# Patient Record
Sex: FEMALE | Race: WHITE
[De-identification: names, ages, dates, MRNs, and addresses within clinical notes are randomized per-mention and may not be internally consistent; named-entity substitution may affect disease eponyms.]

---

## 2018-08-22 ENCOUNTER — HOSPITAL ENCOUNTER (EMERGENCY)
Dept: HOSPITAL 41 - JD.ED | Age: 48
Discharge: HOME | End: 2018-08-22
Payer: SELF-PAY

## 2018-08-22 DIAGNOSIS — Z79.899: ICD-10-CM

## 2018-08-22 DIAGNOSIS — Z91.09: ICD-10-CM

## 2018-08-22 DIAGNOSIS — S20.211A: Primary | ICD-10-CM

## 2018-08-22 DIAGNOSIS — I10: ICD-10-CM

## 2018-08-22 DIAGNOSIS — Z91.030: ICD-10-CM

## 2018-08-22 DIAGNOSIS — I25.2: ICD-10-CM
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
## 2018-08-22 DIAGNOSIS — F17.210: ICD-10-CM

## 2018-08-22 PROCEDURE — 99283 EMERGENCY DEPT VISIT LOW MDM: CPT

## 2018-08-22 PROCEDURE — 71045 X-RAY EXAM CHEST 1 VIEW: CPT

## 2018-08-22 NOTE — EDM.PDOC
ED HPI GENERAL MEDICAL PROBLEM





- General


Chief Complaint: General


Stated Complaint: FALL/RIB PAIN


Time Seen by Provider: 08/22/18 14:51


Source of Information: Reports: Patient


History Limitations: Reports: No Limitations





- History of Present Illness


INITIAL COMMENTS - FREE TEXT/NARRATIVE: 


Patient is a 48-year-old female with a history of right rib fractures 4 weeks 

ago who fell last night when her sister's dog chain wrapped around her leg 

causing her to fall off the deck approximately 3 feet. Patient landed on her 

right ribs. Since then has developed a small lump with increasing pain along 

where the previous fracture was noted. She has not taken anything for the pain. 

Pain is worse with palpation and also taking a deep breath. There is no 

hemoptysis. Denies any neck or back pain. No head trauma or loss of conscious. 

No numbness/tingling to extremities. No other complaints.





Treatments PTA: Reports: Other (see below)


Other Treatments PTA: none


  ** Right


Pain Score (Numeric/FACES): 10





- Related Data


 Allergies











Allergy/AdvReac Type Severity Reaction Status Date / Time


 


bee venom protein (honey bee) Allergy  Anaphylactic Verified 08/22/18 16:24





   Shock  


 


insect venom Allergy  Anaphylactic Verified 08/22/18 16:24





   Shock  


 


suture Allergy  Hives Verified 08/22/18 16:24











Home Meds: 


 Home Meds





Acetaminophen/oxyCODONE [Percocet 325-5 MG] 1 each PO Q6H PRN #12 tab 08/22/18 [

Rx]


Lisinopril 10 mg PO DAILY 08/22/18 [History]











Past Medical History


Cardiovascular History: Reports: Hypertension, MI


Other Cardiovascular History: 2017 MI


Musculoskeletal History: Reports: Other (See Below)


Other Musculoskeletal History: rib fx


Neurological History: Reports: Brain Injury, Head Trauma





- Past Surgical History


HEENT Surgical History: Reports: Tonsillectomy


GI Surgical History: Reports: Appendectomy


Female  Surgical History: Reports: Hysterectomy, Other (See Below)


Other Female  Surgeries/Procedures: bladder sling


Musculoskeletal Surgical History: Reports: Other (See Below)





Social & Family History





- Family History


Family Medical History: Noncontributory





- Tobacco Use


Smoking Status *Q: Current Every Day Smoker


Years of Tobacco use: 38


Packs/Tins Daily: 0.5





- Caffeine Use


Caffeine Use: Reports: Coffee, Energy Drinks, Soda





- Recreational Drug Use


Recreational Drug Use: No





ED ROS GENERAL





- Review of Systems


Review Of Systems: ROS reveals no pertinent complaints other than HPI.





ED EXAM, GENERAL





- Physical Exam


Exam: See Below


Exam Limited By: No Limitations


General Appearance: Alert, WD/WN, Mild Distress


Eye Exam: Bilateral Eye: Normal Inspection


Ears: Hearing Grossly Normal


Nose: Normal Inspection


Throat/Mouth: Normal Voice, No Airway Compromise


Neck: Normal Inspection, Supple


Respiratory/Chest: No Respiratory Distress, Lungs Clear, Normal Breath Sounds, 

No Accessory Muscle Use, Other (Tenderness along the 8 and 9 right rib 

midaxillary. Small lump underneath the skin noted. No significant swelling, 

bruising noted. Small abrasion noted.)


Cardiovascular: Normal Peripheral Pulses, Regular Rate, Rhythm, No Murmur


Peripheral Pulses: 2+: Radial (L), Radial (R)


GI/Abdominal: Normal Bowel Sounds, Soft, Non-Tender


Back Exam: Normal Inspection


Extremities: Normal Inspection


Neurological: Alert, Oriented, CN II-XII Intact, Normal Cognition, No Motor/

Sensory Deficits


Psychiatric: Normal Affect, Normal Mood


Skin Exam: Warm, Dry, Intact, Normal Color





Course





- Vital Signs


Last Recorded V/S: 


 Last Vital Signs











Temp  98.3 F   08/22/18 14:41


 


Pulse  99   08/22/18 14:41


 


Resp  20   08/22/18 14:41


 


BP  153/95 H  08/22/18 14:41


 


Pulse Ox  98   08/22/18 14:41














- Orders/Labs/Meds


Meds: 


Medications














Discontinued Medications














Generic Name Dose Route Start Last Admin





  Trade Name Hiral  PRN Reason Stop Dose Admin


 


Oxycodone/Acetaminophen  1 tab  08/22/18 15:03  08/22/18 15:40





  Percocet 325-5 Mg  PO  08/22/18 15:04  1 tab





  ONETIME ONE   Administration





     





     





     





     














- Re-Assessments/Exams


Free Text/Narrative Re-Assessment/Exam: 





Order x-ray of the chest with Percocet one tab by mouth.





X-ray of the chest did not reveal any acute findings. No obvious fracture noted.





Reassessment, patient's pain is improving.





Will discharge patient home with instructions as documented.The patient 

remained hemodynamically stable while under my care in the E.D. I discussed the 

concerning symptoms for which to return to the E.D. with the patient/family. 

The patient/family verbalized understanding. All questions were answered.











Departure





- Departure


Time of Disposition: 15:47


Disposition: Home, Self-Care 01


Condition: Good


Clinical Impression: 


Contusion of rib on right side


Qualifiers:


 Encounter type: initial encounter Qualified Code(s): S20.211A - Contusion of 

right front wall of thorax, initial encounter








- Discharge Information


Prescriptions: 


Acetaminophen/oxyCODONE [Percocet 325-5 MG] 1 each PO Q6H PRN #12 tab


 PRN Reason: Pain (Severe 7-10)


Instructions:  Rib Contusion


Referrals: 


PCP,None [Primary Care Provider] - 


Forms:  ED Department Discharge


Additional Instructions: 


Apply ice to the affected area 3 times a day, 20 minutes in duration, do not 

apply ice directly on the skin. Utilize ibuprofen 600 mg every 6 hours as 

needed for discomfort. Take with food and drink plenty of water. Refrain from 

any activities that cause worsening pain. May utilize topical pain ointments 

such as Biofreeze intermittently throughout the day as needed. For severe pain 

take percocet one tablet every 6 hours as needed.  Do not drive today and/or 

while taking the Percocet due to sedative side effects. Please return to the ED 

if you develop any new or worsening symptoms. This will take time to heal. 

Please see your PCP as needed on follow-up.

## 2018-08-27 NOTE — CR
Chest: Portable view of the chest was obtained.

 

Comparison: Prior chest x-ray of 07/25/18.

 

Heart size and mediastinum are within normal limits for portable 

technique.  Lungs are clear without acute parenchymal change.  Bony 

structures are grossly intact.

 

Impression:

1.  Nothing acute is seen on portable chest x-ray.

 

Diagnostic code #2

## 2018-09-22 ENCOUNTER — HOSPITAL ENCOUNTER (EMERGENCY)
Dept: HOSPITAL 56 - MW.ED | Age: 48
Discharge: HOME | End: 2018-09-22
Payer: SELF-PAY
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
## 2018-09-22 DIAGNOSIS — S51.812A: Primary | ICD-10-CM

## 2018-09-22 NOTE — EDM.PDOC
ED HPI GENERAL MEDICAL PROBLEM





- General


Chief Complaint: Laceration


Stated Complaint: CUT ON LEFT ARM


Time Seen by Provider: 09/22/18 00:22


Source of Information: Reports: Patient





- History of Present Illness


INITIAL COMMENTS - FREE TEXT/NARRATIVE: 





HISTORY AND PHYSICAL:


History of present illness:


[Patient cut her posterior forearm full-thickness skin 2 inch linear incision 

while washing dishes tonight





No fever nausea vomiting chills sweats]


Review of systems: 


As per history of present illness and below otherwise all systems reviewed and 

negative.


Past medical history: 


As per history of present illness and as reviewed below otherwise 

noncontributory.


Surgical history: 


As per history of present illness and as reviewed below otherwise 

noncontributory.


Social history: 


No reported history of drug or alcohol abuse.


Family history: 


As per history of present illness and as reviewed below otherwise 

noncontributory.





Physical exam:


HEENT: Atraumatic, normocephalic, pupils reactive, negative for conjunctival 

pallor or scleral icterus, mucous membranes moist, throat clear, neck supple, 

nontender, trachea midline.


Lungs: Clear to auscultation, breath sounds equal bilaterally, chest nontender.


Heart: S1S2, regular, negative for clicks, rubs, or JVD.


Abdomen: Soft, nondistended, nontender. Negative for masses or 

hepatosplenomegaly. Negative for costovertebral tenderness.


Pelvis: Stable nontender.


Genitourinary: Deferred.


Rectal: Deferred.


Extremities: Atraumatic, negative for cords or calf pain. Neurovascular 

unremarkable.


Neuro: Awake, alert, oriented. Cranial nerves II through XII unremarkable. 

Cerebellum unremarkable. Motor and sensory unremarkable throughout. Exam 

nonfocal.


Skin as per history of present illness otherwise unremarkable


Diagnostics:


[]Clinical





Therapeutics:


[]Tetanus status is up-to-date per patient


 lens and explored


Lidocaine 2 mL for anesthesia


#3 4-0 interru sutures








Impression


2 inch linear laceration, simple: 


[]


Definitive disposition and diagnosis as appropriate pending reevaluation and 

review of above.


  ** left arm


Pain Score (Numeric/FACES): 8





- Related Data


 Allergies











Allergy/AdvReac Type Severity Reaction Status Date / Time


 


bee venom protein (honey bee) Allergy  Anaphylactic Verified 09/22/18 00:18





   Shock  


 


insect venom Allergy  Anaphylactic Verified 09/22/18 00:18





   Shock  


 


suture Allergy  Hives Verified 09/22/18 00:18











Home Meds: 


 Home Meds





. [No Known Home Meds]  09/22/18 [History]











Past Medical History


Cardiovascular History: Reports: Hypertension, MI


Other Cardiovascular History: 2017 MI


Gastrointestinal History: Reports: None


OB/GYN History: Reports: Pregnancy


Musculoskeletal History: Reports: Other (See Below)


Other Musculoskeletal History: rib fx


Neurological History: Reports: Brain Injury, Head Trauma


Hematologic History: Reports: Other (See Below)


Other Hematologic History: thalasemia





- Past Surgical History


HEENT Surgical History: Reports: Tonsillectomy


Cardiovascular Surgical History: Reports: None


GI Surgical History: Reports: Appendectomy


Female  Surgical History: Reports: Hysterectomy, Other (See Below)


Other Female  Surgeries/Procedures: bladder sling


Musculoskeletal Surgical History: Reports: None





Social & Family History





- Family History


Family Medical History: Noncontributory





- Tobacco Use


Smoking Status *Q: Current Every Day Smoker


Years of Tobacco use: 25


Packs/Tins Daily: 0.1





- Caffeine Use


Caffeine Use: Reports: Coffee





- Recreational Drug Use


Recreational Drug Use: No





ED ROS GENERAL





- Review of Systems


Review Of Systems: See Below





ED EXAM, SKIN/RASH


Exam: See Below





Course





- Vital Signs


Last Recorded V/S: 


 Last Vital Signs











Temp  97.1 F   09/22/18 00:09


 


Pulse  97   09/22/18 00:09


 


Resp  18   09/22/18 00:09


 


BP  133/109 H  09/22/18 00:09


 


Pulse Ox  97   09/22/18 00:09














- Orders/Labs/Meds


Meds: 


Medications














Discontinued Medications














Generic Name Dose Route Start Last Admin





  Trade Name Hailey  PRN Reason Stop Dose Admin


 


Bacitracin  1 dose  09/22/18 00:30  09/22/18 00:41





  Bacitracin Oint 1 Gm  TOP  09/22/18 00:31  1 dose





  ONETIME ONE   Administration





     





     





     





     


 


Lidocaine HCl  Confirm  09/22/18 00:33  09/22/18 00:41





  Xylocaine-Mpf 1%  Administered  09/22/18 00:34  Not Given





  Dose   





  5 mls @ as directed   





  .ROUTE   





  .STK-MED ONE   





     





     





     





     


 


Lidocaine HCl  10 ml  09/22/18 00:25  09/22/18 00:42





  Xylocaine 1%  INJECT  09/22/18 00:26  Not Given





  ONETIME ONE   





     





     





     





     


 


Lidocaine HCl  10 ml  09/22/18 00:40  





  Xylocaine-Mpf 1%  INJECT  09/22/18 00:41  





  ONETIME ONE   





     





     





     





     














Departure





- Departure


Time of Disposition: 00:43


Disposition: Home, Self-Care 01


Condition: Good


Clinical Impression: 


 Laceration








- Discharge Information


Referrals: 


PCP,None [Primary Care Provider] - 


Forms:  ED Department Discharge


Additional Instructions: 


Standard wound care instructions


Keep wound clean and dry for 48 hours


Return if redness warmth or pus drainage should this develop


Follow-up for suture removal in 10 days





The following information is given to patients seen in the emergency department 

who are being discharged to home. This information is to outline your options 

for follow-up care. We provide all patients seen in our emergency department 

with a follow-up referral.





The need for follow-up, as well as the timing and circumstances, are variable 

depending upon the specifics of your emergency department visit.





If you don't have a primary care physician on staff, we will provide you with a 

referral. We always advise you to contact your personal physician following an 

emergency department visit to inform them of the circumstance of the visit and 

for follow-up with them and/or the need for any referrals to a consulting 

specialist.





The emergency department will also refer you to a specialist when appropriate. 

This referral assures that you have the opportunity for follow-up care with a 

specialist. All of these measure are taken in an effort to provide you with 

optimal care, which includes your follow-up.





Under all circumstances we always encourage you to contact your private 

physician who remains a resource for coordinating your care. When calling for 

follow-up care, please make the office aware that this follow-up is from your 

recent emergency room visit. If for any reason you are refused follow-up, 

please contact the [facility] emergency department at [phone] 

and asked to speak to the emergency department charge nurse.